# Patient Record
Sex: FEMALE | Race: WHITE | NOT HISPANIC OR LATINO | Employment: FULL TIME | URBAN - METROPOLITAN AREA
[De-identification: names, ages, dates, MRNs, and addresses within clinical notes are randomized per-mention and may not be internally consistent; named-entity substitution may affect disease eponyms.]

---

## 2024-01-30 ENCOUNTER — OFFICE VISIT (OUTPATIENT)
Dept: DERMATOLOGY | Facility: CLINIC | Age: 39
End: 2024-01-30
Payer: COMMERCIAL

## 2024-01-30 DIAGNOSIS — D23.9 DERMATOFIBROMA: ICD-10-CM

## 2024-01-30 DIAGNOSIS — L81.4 LENTIGO: ICD-10-CM

## 2024-01-30 DIAGNOSIS — D22.9 MULTIPLE BENIGN NEVI: Primary | ICD-10-CM

## 2024-01-30 DIAGNOSIS — Z12.83 SCREENING EXAM FOR SKIN CANCER: ICD-10-CM

## 2024-01-30 DIAGNOSIS — Z85.820 PERSONAL HISTORY OF MALIGNANT MELANOMA OF SKIN: ICD-10-CM

## 2024-01-30 PROBLEM — D22.62 MELANOCYTIC NEVI OF LEFT UPPER LIMB, INCLUDING SHOULDER: Status: ACTIVE | Noted: 2021-07-13

## 2024-01-30 PROBLEM — D22.62 MELANOCYTIC NEVI OF LEFT UPPER LIMB, INCLUDING SHOULDER: Status: RESOLVED | Noted: 2021-07-13 | Resolved: 2024-01-30

## 2024-01-30 PROBLEM — L70.9 ACNE: Status: ACTIVE | Noted: 2021-07-13

## 2024-01-30 PROCEDURE — 99213 OFFICE O/P EST LOW 20 MIN: CPT | Performed by: DERMATOLOGY

## 2024-01-30 ASSESSMENT — ITCH NUMERIC RATING SCALE: HOW SEVERE IS YOUR ITCHING?: 0

## 2024-01-30 NOTE — PROGRESS NOTES
Subjective     Kathi Amado is a 38 y.o. female who presents for the following: Skin Check.     Last derm visit 1/2023 for Full Skin Exam, no lesions of concern at that time            Review of Systems:  No other skin or systemic complaints other than what is documented elsewhere in the note.    The following portions of the chart were reviewed this encounter and updated as appropriate:         Skin Cancer History  No skin cancer on file.      Specialty Problems          Dermatology Problems    Acne    Personal history of malignant melanoma of skin     Melanoma 1:Year Diagnosed:  2012. April.Location:  Left Thigh.Treatment(s):  Wide Local Excision.Excision margin:   2 cmSentinel Lymph Node Evaluation: Positive  Left Inguinal.Breslow's Thickness:  1.68 mmClark's Level:  IVUlceration:  presentMitotic Index:  3/mm².Clinical Stage:  IIIB (T2b, N2a,M0)Progress:  A PET scan wasperformed on May 14, 2012, which showed no abnormal uptake to suggestdistant metastatic disease. S/P leftinguinal completion lymph node dissection with evidence of metastasis in 2lymph nodes for a total of 3/19 positive lymph nodes.She subsequentlyreceived adjuvant ipililumab therapy on clinical trial at the [facility].REc'd 3/4 induction. She received 2 doses out of the 4 planned doses         Multiple dysplastic nevi     - Lesion 1: Severely Dysplastic Nevus.Date of Biopsy: 02/05/2013Location:  Left Posterior Shoulder.Treatment(s): Wide Local Excision.Pathology: DYSPLASTIC COMPOUND NEVUS WITH CRUST, SEE NOTE.Note: Microscopic examination reveals a specimen that extends into thesubcutaneous fat. There is a fairly well circumscribed, slightly asymmetricproliferation of nested and single moderately atypical melanocytes along thedermal-epidermal junction with bridging of adjacent nests of melanocytes. Thereare dyskeratotic keratinocytes and there appear to be occasional pagetoidcells. There is serum crust with neutrophils and there is a  mild underlyinglymphocytic infiltrate with nests of benign appearing melanocytes.This specimen has moderate to severe architectural disorder with moderatecytologic atypia. Some of the architectural disorder is due to the evidence ofirritation. However, due to the degree of architectural disorder and theproximity to the peripheral margin a conservative complete re-excision isrecommended.03/06/2013 re-excision path: SCAR, PRESENT ON THE PERIPHERAL MARGIN AND SUTURE.  -Lesion 2: Mild Dysplastic Nevus.Date of Biopsy: 05/06/2013Location:  Left Deltoid.Treatment(s): ExcisionPathology: MILDLY DYSPLASTIC JUNCTIONAL NEVUS, INKED MARGINS FREE IN PLANES OF SECTIONSEXAMINED.  -Lesion 3: Mild Dysplastic Nevus.Date of Biopsy: 5/6/2013Location:  Left Back.Treatment(s): Wide Local Excision.Pathology: MILDLY DYSPLASTIC COMPOUND NEVUS, INKED MARGINS FREE IN PLANES OF SECTIONSEXAMINED.  -Lesion 4: Mild Dysplastic Nevus.Date of Biopsy: 12/16/2013Location:  Right Lateral Upper Arm.Treatment(s): Wide Local Excision.Pathology: MILDLY DYSPLASTIC JUNCTIONAL NEVUS, INKED MARGINS FREE IN PLANES OF SECTIONSEXAMINED.Lesion 5: Mild Dysplastic Nevus. Date of Biopsy: 12/16/2013Location:  Left Upper Thigh.Treatment(s): Wide Local Excision.Pathology: MILDLY DYSPLASTIC JUNCTIONAL NEVUS, INKED MARGINS FREE IN PLANES OF SECTIONSEXAMINED.             Objective   Well appearing patient in no apparent distress; mood and affect are within normal limits.    A full examination was performed including scalp, head, eyes, ears, nose, lips, neck, chest, axillae, abdomen, back, buttocks, bilateral upper extremities, bilateral lower extremities, hands, feet, fingers, toes, fingernails, and toenails. All findings within normal limits unless otherwise noted below.    Assessment/Plan   1. Multiple benign nevi  Brown and tan macules and papules with reassuring findings on dermoscopy    -These lesions have benign, reassuring patterns on dermoscopy  -Recommend continued  self observation, and to contact the office if any changes in nevi are noticed    2. Lentigo  Tan macules    -Benign appearing on exam  -Reassurance, recommend observation    3. Personal history of malignant melanoma of skin  Lymph node basins palpated in relevant regions without appreciable adenopathy; regions include the neck and/or supraclavicular and/or axillary and/or inguinal and/or popliteal skin.    Personal History of Malignant Melanoma  -Well healed scar(s) with no evidence of recurrence  -Discussed the need for regular full skin examinations in the office, and monthly self examinations at home  -Discussed the need for annual ophthalmology exams with dilation  -Discussed concerning lesions and when to return sooner if any changes noted in skin lesions. Patient verbalizes understanding.    4. Screening exam for skin cancer    Full body skin exam  -No lesions concerning for malignancy on the remainder the skin exam today   - The ugly duckling sign was discussed. Monitor for any skin lesions that are different in color, shape, or size than others on body  -Sun protection was discussed. Recommend SPF 30+, hats with brims, sun protective clothing, and avoiding sun exposure between 10 AM and 2 PM whenever possible  -Recommend regular skin exams or sooner if new or changing lesions       5. Dermatofibroma  Left Lower Leg - Anterior  Firm hyperpigmented papule with positive dimple sign. On dermoscopy, there is a central scar-like area with a uniform peripheral pigment network.    -Discussed nature of condition  -Reassurance, recommend observation  -Recommend that the patient avoid trauma/injury/manipulation to the area to avoid the lesion enlarging  -Recommend the patient return for re-evaluation if the lesion enlarges, becomes painful or symptomatic, or is changing        Follow up 1 year Full Skin Exam

## 2025-02-04 ENCOUNTER — APPOINTMENT (OUTPATIENT)
Dept: DERMATOLOGY | Facility: CLINIC | Age: 40
End: 2025-02-04
Payer: COMMERCIAL

## 2025-02-04 DIAGNOSIS — Z85.820 PERSONAL HISTORY OF MALIGNANT MELANOMA OF SKIN: ICD-10-CM

## 2025-02-04 DIAGNOSIS — D23.9 DERMATOFIBROMA: ICD-10-CM

## 2025-02-04 DIAGNOSIS — Z86.018 HISTORY OF DYSPLASTIC NEVUS: ICD-10-CM

## 2025-02-04 DIAGNOSIS — L81.4 LENTIGO: ICD-10-CM

## 2025-02-04 DIAGNOSIS — D22.9 MULTIPLE BENIGN NEVI: Primary | ICD-10-CM

## 2025-02-04 DIAGNOSIS — Z12.83 SCREENING EXAM FOR SKIN CANCER: ICD-10-CM

## 2025-02-04 PROCEDURE — 99213 OFFICE O/P EST LOW 20 MIN: CPT | Performed by: DERMATOLOGY

## 2025-02-04 ASSESSMENT — DERMATOLOGY PATIENT ASSESSMENT
ARE YOU AN ORGAN TRANSPLANT RECIPIENT: NO
ARE YOU ON BIRTH CONTROL: NO
DO YOU HAVE ANY NEW OR CHANGING LESIONS: NO
DO YOU USE SUNSCREEN: OCCASIONALLY
HAVE YOU HAD OR DO YOU HAVE VASCULAR DISEASE: NO
DO YOU USE A TANNING BED: NO
HAVE YOU HAD OR DO YOU HAVE A STAPH INFECTION: NO
DO YOU HAVE IRREGULAR MENSTRUAL CYCLES: NO
ARE YOU TRYING TO GET PREGNANT: NO

## 2025-02-04 ASSESSMENT — DERMATOLOGY QUALITY OF LIFE (QOL) ASSESSMENT
RATE HOW BOTHERED YOU ARE BY EFFECTS OF YOUR SKIN PROBLEMS ON YOUR ACTIVITIES (EG, GOING OUT, ACCOMPLISHING WHAT YOU WANT, WORK ACTIVITIES OR YOUR RELATIONSHIPS WITH OTHERS): 0 - NEVER BOTHERED
ARE THERE EXCLUSIONS OR EXCEPTIONS FOR THE QUALITY OF LIFE ASSESSMENT: NO
RATE HOW EMOTIONALLY BOTHERED YOU ARE BY YOUR SKIN PROBLEM (FOR EXAMPLE, WORRY, EMBARRASSMENT, FRUSTRATION): 0 - NEVER BOTHERED
RATE HOW BOTHERED YOU ARE BY SYMPTOMS OF YOUR SKIN PROBLEM (EG, ITCHING, STINGING BURNING, HURTING OR SKIN IRRITATION): 0 - NEVER BOTHERED
DATE THE QUALITY-OF-LIFE ASSESSMENT WAS COMPLETED: 67240

## 2025-02-04 ASSESSMENT — PATIENT GLOBAL ASSESSMENT (PGA): PATIENT GLOBAL ASSESSMENT: PATIENT GLOBAL ASSESSMENT:  1 - CLEAR

## 2025-02-04 ASSESSMENT — ITCH NUMERIC RATING SCALE: HOW SEVERE IS YOUR ITCHING?: 0

## 2025-02-04 NOTE — PROGRESS NOTES
Subjective     Kathi Miguel is a 39 y.o. female who presents for the following: Skin Check. Last derm visit 1/30/24 for Full Skin Exam. History of melanoma and dysplastic nevi    Review of Systems:  No other skin or systemic complaints other than what is documented elsewhere in the note.    The following portions of the chart were reviewed this encounter and updated as appropriate:         Skin Cancer History  No skin cancer on file.      Specialty Problems          Dermatology Problems    Acne    Personal history of malignant melanoma of skin     Melanoma 1:Year Diagnosed:  2012. April.Location:  Left Thigh.Treatment(s):  Wide Local Excision.Excision margin:   2 cmSentinel Lymph Node Evaluation: Positive  Left Inguinal.Breslow's Thickness:  1.68 mmClark's Level:  IVUlceration:  presentMitotic Index:  3/mm².Clinical Stage:  IIIB (T2b, N2a,M0)Progress:  A PET scan wasperformed on May 14, 2012, which showed no abnormal uptake to suggestdistant metastatic disease. S/P leftinguinal completion lymph node dissection with evidence of metastasis in 2lymph nodes for a total of 3/19 positive lymph nodes.She subsequentlyreceived adjuvant ipililumab therapy on clinical trial at the [facility].REc'd 3/4 induction. She received 2 doses out of the 4 planned doses         Multiple dysplastic nevi     - Lesion 1: Severely Dysplastic Nevus.Date of Biopsy: 02/05/2013Location:  Left Posterior Shoulder.Treatment(s): Wide Local Excision.Pathology: DYSPLASTIC COMPOUND NEVUS WITH CRUST, SEE NOTE.Note: Microscopic examination reveals a specimen that extends into thesubcutaneous fat. There is a fairly well circumscribed, slightly asymmetricproliferation of nested and single moderately atypical melanocytes along thedermal-epidermal junction with bridging of adjacent nests of melanocytes. Thereare dyskeratotic keratinocytes and there appear to be occasional pagetoidcells. There is serum crust with neutrophils and there is a mild  underlyinglymphocytic infiltrate with nests of benign appearing melanocytes.This specimen has moderate to severe architectural disorder with moderatecytologic atypia. Some of the architectural disorder is due to the evidence ofirritation. However, due to the degree of architectural disorder and theproximity to the peripheral margin a conservative complete re-excision isrecommended.03/06/2013 re-excision path: SCAR, PRESENT ON THE PERIPHERAL MARGIN AND SUTURE.  -Lesion 2: Mild Dysplastic Nevus.Date of Biopsy: 05/06/2013Location:  Left Deltoid.Treatment(s): ExcisionPathology: MILDLY DYSPLASTIC JUNCTIONAL NEVUS, INKED MARGINS FREE IN PLANES OF SECTIONSEXAMINED.  -Lesion 3: Mild Dysplastic Nevus.Date of Biopsy: 5/6/2013Location:  Left Back.Treatment(s): Wide Local Excision.Pathology: MILDLY DYSPLASTIC COMPOUND NEVUS, INKED MARGINS FREE IN PLANES OF SECTIONSEXAMINED.  -Lesion 4: Mild Dysplastic Nevus.Date of Biopsy: 12/16/2013Location:  Right Lateral Upper Arm.Treatment(s): Wide Local Excision.Pathology: MILDLY DYSPLASTIC JUNCTIONAL NEVUS, INKED MARGINS FREE IN PLANES OF SECTIONSEXAMINED.Lesion 5: Mild Dysplastic Nevus. Date of Biopsy: 12/16/2013Location:  Left Upper Thigh.Treatment(s): Wide Local Excision.Pathology: MILDLY DYSPLASTIC JUNCTIONAL NEVUS, INKED MARGINS FREE IN PLANES OF SECTIONSEXAMINED.             Objective   Well appearing patient in no apparent distress; mood and affect are within normal limits.    A full examination was performed including scalp, head, eyes, ears, nose, lips, neck, chest, axillae, abdomen, back, buttocks, bilateral upper extremities, bilateral lower extremities, hands, feet, fingers, toes, fingernails, and toenails. All findings within normal limits unless otherwise noted below.    Assessment/Plan   1. Multiple benign nevi  Brown and tan macules and papules with reassuring findings on dermoscopy    -These lesions have benign, reassuring patterns on dermoscopy  -Recommend continued self  observation, and to contact the office if any changes in nevi are noticed    2. Screening exam for skin cancer    Full body skin exam  -No lesions concerning for malignancy on the remainder the skin exam today   - The ugly duckling sign was discussed. Monitor for any skin lesions that are different in color, shape, or size than others on body  -Sun protection was discussed. Recommend SPF 30+, hats with brims, sun protective clothing, and avoiding sun exposure between 10 AM and 2 PM whenever possible  -Recommend regular skin exams or sooner if new or changing lesions       Related Procedures  Follow Up In Dermatology - Established Patient  Follow Up In Dermatology - Established Patient    3. Lentigo  Tan macules    -Benign appearing on exam  -Reassurance, recommend observation    4. Personal history of malignant melanoma of skin  Lymph node basins palpated in relevant regions without appreciable adenopathy; regions include the neck and/or supraclavicular and/or axillary and/or inguinal and/or popliteal skin.    Personal History of Malignant Melanoma  -Well healed scar(s) with no evidence of recurrence  -Discussed the need for regular full skin examinations in the office, and monthly self examinations at home  -Discussed the need for annual ophthalmology exams with dilation  -Discussed concerning lesions and when to return sooner if any changes noted in skin lesions. Patient verbalizes understanding.    5. History of dysplastic nevus    6. Dermatofibroma  Left Lower Leg - Anterior  Firm hyperpigmented papule with positive dimple sign. On dermoscopy, there is a central scar-like area with a uniform peripheral pigment network.    -Discussed nature of condition  -Reassurance, recommend observation  -Recommend that the patient avoid trauma/injury/manipulation to the area to avoid the lesion enlarging  -Recommend the patient return for re-evaluation if the lesion enlarges, becomes painful or symptomatic, or is  changing        Follow up 1 year Full Skin Exam

## 2026-02-05 ENCOUNTER — APPOINTMENT (OUTPATIENT)
Dept: DERMATOLOGY | Facility: CLINIC | Age: 41
End: 2026-02-05
Payer: COMMERCIAL